# Patient Record
Sex: FEMALE | Race: WHITE
[De-identification: names, ages, dates, MRNs, and addresses within clinical notes are randomized per-mention and may not be internally consistent; named-entity substitution may affect disease eponyms.]

---

## 2020-10-31 ENCOUNTER — HOSPITAL ENCOUNTER (EMERGENCY)
Dept: HOSPITAL 11 - JP.ED | Age: 60
Discharge: HOME | End: 2020-10-31
Payer: COMMERCIAL

## 2020-10-31 DIAGNOSIS — N39.0: Primary | ICD-10-CM

## 2020-10-31 DIAGNOSIS — Z88.2: ICD-10-CM

## 2020-10-31 PROCEDURE — 87086 URINE CULTURE/COLONY COUNT: CPT

## 2020-10-31 PROCEDURE — 87186 SC STD MICRODIL/AGAR DIL: CPT

## 2020-10-31 PROCEDURE — 81001 URINALYSIS AUTO W/SCOPE: CPT

## 2020-10-31 PROCEDURE — 99283 EMERGENCY DEPT VISIT LOW MDM: CPT

## 2020-10-31 PROCEDURE — 87088 URINE BACTERIA CULTURE: CPT

## 2020-10-31 NOTE — EDM.PDOC
ED HPI GENERAL MEDICAL PROBLEM





- General


Chief Complaint: Genitourinary Problem


Stated Complaint: BLADDER INFECTION


Time Seen by Provider: 10/31/20 03:44


Source of Information: Reports: Patient, RN Notes Reviewed


History Limitations: Reports: No Limitations





- History of Present Illness


INITIAL COMMENTS - FREE TEXT/NARRATIVE: 





60-year-old gentleman presents emergency department with a complaint of dysuria,

she states been going on for 24 hours she is having no other symptoms no fevers


  ** Suprapubic


Pain Score (Numeric/FACES): 5





- Related Data


                                    Allergies











Allergy/AdvReac Type Severity Reaction Status Date / Time


 


Sulfa (Sulfonamide Allergy  Swelling Verified 10/31/20 03:24





Antibiotics)     











Home Meds: 


                                    Home Meds





NK [No Known Home Meds]  10/31/20 [History]


methocarbamoL [Methocarbamol] 500 mg PO ASDIRECTED PRN 10/31/20 [History]











Past Medical History


HEENT History: Reports: Impaired Vision


Cardiovascular History: Reports: High Cholesterol, Other (See Below)


Other Cardiovascular History: PFO - did not close as she grew


Genitourinary History: Reports: UTI, Recurrent


Musculoskeletal History: Reports: Back Pain, Chronic


Oncologic (Cancer) History: Reports: Lung





- Infectious Disease History


Infectious Disease History: Reports: Chicken Pox, Herpes, Measles, Mumps, 

Rubella





- Past Surgical History


HEENT Surgical History: Reports: LASIK


Oncologic Surgical History: Reports: Lobectomy, Other (See Below)


Other Oncologic Surgeries/Procedures: right lower lobe





Social & Family History





- Tobacco Use


Tobacco Use Status *Q: Never Tobacco User





ED ROS GENERAL





- Review of Systems


Review Of Systems: See Below


Constitutional: Denies: Fever, Chills


: Reports: Dysuria





ED EXAM, RENAL/





- Physical Exam


Exam: See Below


Exam Limited By: No Limitations


General Appearance: Alert, WD/WN, No Apparent Distress


Respiratory/Chest: No Respiratory Distress


GI/Abdominal: Soft, Tender (Suprapubic discomfort)





Course





- Vital Signs


Last Recorded V/S: 





                                Last Vital Signs











Temp  97.3 F   10/31/20 03:36


 


Pulse  95   10/31/20 03:36


 


Resp  18   10/31/20 03:36


 


BP  109/69   10/31/20 03:36


 


Pulse Ox  98   10/31/20 03:36














- Orders/Labs/Meds


Orders: 





                               Active Orders 24 hr











 Category Date Time Status


 


 CULTURE URINE [RM] Urgent Lab  10/31/20 03:45 Ordered


 


 Phenazopyridine [Urinary Pain Relief] Med  10/31/20 03:53 Once





 190 mg PO ONETIME ONE   








                                Medication Orders





Phenazopyridine HCl (Urinary Pain Relief)  190 mg PO ONETIME ONE


   Stop: 10/31/20 03:54








Labs: 





                                Laboratory Tests











  10/31/20 Range/Units





  03:18 


 


Urine Color  Yellow  (YELLOW)  


 


Urine Appearance  Cloudy A  (CLEAR)  


 


Urine pH  5.5  (5.0-8.0)  


 


Ur Specific Gravity  >= 1.030  (1.008-1.030)  


 


Urine Protein  100 H  (NEGATIVE)  mg/dL


 


Urine Glucose (UA)  Negative  (NEGATIVE)  mg/dL


 


Urine Ketones  Negative  (NEGATIVE)  mg/dL


 


Urine Occult Blood  Large H  (NEGATIVE)  


 


Urine Nitrite  Positive H  (NEGATIVE)  


 


Urine Bilirubin  Negative  (NEGATIVE)  


 


Urine Urobilinogen  0.2  (0.2-1.0)  EU/dL


 


Ur Leukocyte Esterase  Small H  (NEGATIVE)  


 


Urine RBC  >100 H  (0-5)  


 


Urine WBC  20-30 H  (0-5)  


 


Ur Epithelial Cells  Few  


 


Amorphous Sediment  Not seen  


 


Urine Bacteria  Many  


 


Urine Mucus  Not seen  











Meds: 





Medications











Generic Name Dose Route Start Last Admin





  Trade Name Freq  PRN Reason Stop Dose Admin


 


Phenazopyridine HCl  190 mg  10/31/20 03:53 





  Urinary Pain Relief  PO  10/31/20 03:54 





  ONETIME ONE  














Departure





- Departure


Time of Disposition: 03:56


Disposition: Home, Self-Care 01


Condition: Good


Clinical Impression: 


 UTI, Urinary tract infectious disease








- Discharge Information


Instructions:  Urinary Tract Infection, Adult, Easy-to-Read


Referrals: 


PCP,None [Primary Care Provider] - 


Additional Instructions: 


Take full course of antibiotics, continue to use Pyridium for comfort, your 

culture results will be available in 3 to 4 days,  please followup with your 

primary care provider in 3-5 days if not better, please call return to the 

emergency department with worsening of symptoms.





Sepsis Event Note (ED)





- Evaluation


Sepsis Screening Result: No Definite Risk





- Focused Exam


Vital Signs: 





                                   Vital Signs











  Temp Pulse Resp BP Pulse Ox


 


 10/31/20 03:36  97.3 F  95  18  109/69  98


 


 10/31/20 03:35  97.3 F  95  18  109/69  98














- My Orders


Last 24 Hours: 





My Active Orders





10/31/20 03:45


CULTURE URINE [RM] Urgent 





10/31/20 03:53


Phenazopyridine [Urinary Pain Relief]   190 mg PO ONETIME ONE 














- Assessment/Plan


Last 24 Hours: 





My Active Orders





10/31/20 03:45


CULTURE URINE [RM] Urgent 





10/31/20 03:53


Phenazopyridine [Urinary Pain Relief]   190 mg PO ONETIME ONE 











Plan: 





Assessment





Acuity = acute





Site and laterality = urinary tract infection





Etiology  = probable bacterial cause





Manifestations = none





Location of injury =  Home





Lab values = greater than 100 RBCs consistent with hematuria 20-30 WBCs 

consistent with pyuria cultures pending positive nitrates





Plan


Treat empirically Augmentin 875 p.o. twice daily x7 days in combination with 

Pyridium, follow-up primary care 3 to 5 days if not better culture results will 

be available in 3 to 4 days

















 This note was dictated using dragon voice recognition software please call with

any questions on syntax or grammar.

## 2020-11-06 ENCOUNTER — HOSPITAL ENCOUNTER (EMERGENCY)
Dept: HOSPITAL 11 - JP.ED | Age: 60
Discharge: HOME | End: 2020-11-06
Payer: MEDICAID

## 2020-11-06 DIAGNOSIS — Z88.2: ICD-10-CM

## 2020-11-06 DIAGNOSIS — N30.90: Primary | ICD-10-CM

## 2020-11-06 PROCEDURE — 81001 URINALYSIS AUTO W/SCOPE: CPT

## 2020-11-06 PROCEDURE — 99283 EMERGENCY DEPT VISIT LOW MDM: CPT

## 2020-11-06 NOTE — EDM.PDOC
ED HPI GENERAL MEDICAL PROBLEM





- General


Chief Complaint: Genitourinary Problem


Stated Complaint: UTI


Time Seen by Provider: 11/06/20 20:29


Source of Information: Reports: Patient


History Limitations: Reports: No Limitations





- History of Present Illness


INITIAL COMMENTS - FREE TEXT/NARRATIVE: 





chief complaint: bladder pain and pressure





This is a 60 year old female presents to ER for evaluation of bladder pain and 

pressure. She reports it feels just her bladder infection never resolved and now

feels like it is getting worse.





denies fever, chills, nausea, vomiting, diarrhea.


reports dysuria, frequency, bladder pain and pressure


Onset Date: 10/31/20


Duration: Getting Worse


Location: Reports: Abdomen


Quality: Reports: Same as Previous Episode


Improves with: Reports: None


Worsens with: Reports: None


Associated Symptoms: Reports: No Other Symptoms


Treatments PTA: Reports: Other Medication(s)





- Related Data


                                    Allergies











Allergy/AdvReac Type Severity Reaction Status Date / Time


 


Sulfa (Sulfonamide Allergy  Swelling Verified 11/06/20 21:25





Antibiotics)     











Home Meds: 


                                    Home Meds





methocarbamoL [Methocarbamol] 500 mg PO ASDIRECTED PRN 10/31/20 [History]


Amoxicillin/Potassium Clav [Augmentin 875-125 Tablet] 1 tab PO BID 11/06/20 

[History]











Past Medical History


HEENT History: Reports: Impaired Vision


Cardiovascular History: Reports: High Cholesterol, Other (See Below)


Other Cardiovascular History: PFO - did not close as she grew


Genitourinary History: Reports: UTI, Recurrent


Musculoskeletal History: Reports: Back Pain, Chronic


Oncologic (Cancer) History: Reports: Lung





- Infectious Disease History


Infectious Disease History: Reports: Chicken Pox, Herpes, Measles, Mumps, 

Rubella





- Past Surgical History


HEENT Surgical History: Reports: LASIK


Oncologic Surgical History: Reports: Lobectomy, Other (See Below)


Other Oncologic Surgeries/Procedures: right lower lobe





Social & Family History





- Tobacco Use


Tobacco Use Status *Q: Never Tobacco User





- Recreational Drug Use


Recreational Drug Use: No





ED ROS GENERAL





- Review of Systems


Review Of Systems: See Below


Constitutional: Reports: No Symptoms


HEENT: Reports: No Symptoms


Respiratory: Reports: No Symptoms


Cardiovascular: Reports: No Symptoms


Endocrine: Reports: No Symptoms


GI/Abdominal: Reports: Abdominal Pain (low pelvic pain and pressure)


: Reports: Dysuria, Frequency, Hematuria, Pain, Urgency


Musculoskeletal: Reports: Back Pain (chronic)


Skin: Reports: No Symptoms, Change in Color


Psychiatric: Reports: No Symptoms


Hematologic/Lymphatic: Reports: No Symptoms


Immunologic: Reports: No Symptoms





ED EXAM, GI/ABD





- Physical Exam


Exam: See Below


Exam Limited By: No Limitations


General Appearance: Alert, WD/WN, No Apparent Distress, Other (neat and well 

groomed, pleasant. appears younger than stated age.)


Eyes: Bilateral: Normal Appearance


Respiratory/Chest: No Respiratory Distress


GI/Abdominal Exam: Normal Bowel Sounds, Soft, No Distention, Tender 

(pelvic/bladder)


 (Female) Exam: Deferred


Rectal (Female) Exam: Deferred


Back Exam: Normal Inspection, Full Range of Motion


Extremities: Normal Inspection, Normal Range of Motion


Neurological: Alert, Oriented, CN II-XII Intact, Normal Cognition, Normal Gait, 

Normal Reflexes, No Motor/Sensory Deficits


Psychiatric: Normal Affect, Normal Mood


Skin Exam: Warm, Dry, Intact, Normal Color, No Rash


Lymphatic: No Adenopathy





Course





- Vital Signs


Last Recorded V/S: 


                                Last Vital Signs











Temp  35.9 C L  11/06/20 21:27


 


Pulse  70   11/06/20 21:27


 


Resp  16   11/06/20 21:27


 


BP  122/76   11/06/20 21:27


 


Pulse Ox  98   11/06/20 21:27














- Orders/Labs/Meds


Labs: 


                                Laboratory Tests











  11/06/20 Range/Units





  20:32 


 


Urine Color  Yellow  (YELLOW)  


 


Urine Appearance  Clear  (CLEAR)  


 


Urine pH  7.5  (5.0-8.0)  


 


Ur Specific Gravity  1.020  (1.008-1.030)  


 


Urine Protein  Negative  (NEGATIVE)  mg/dL


 


Urine Glucose (UA)  Negative  (NEGATIVE)  mg/dL


 


Urine Ketones  Negative  (NEGATIVE)  mg/dL


 


Urine Occult Blood  Trace-lysed H  (NEGATIVE)  


 


Urine Nitrite  Negative  (NEGATIVE)  


 


Urine Bilirubin  Negative  (NEGATIVE)  


 


Urine Urobilinogen  0.2  (0.2-1.0)  EU/dL


 


Ur Leukocyte Esterase  Negative  (NEGATIVE)  


 


Urine RBC  Not seen  (0-5)  


 


Urine WBC  Not seen  (0-5)  


 


Ur Epithelial Cells  Not seen  


 


Amorphous Sediment  Few  


 


Urine Bacteria  Not seen  











Meds: 


Medications














Discontinued Medications














Generic Name Dose Route Start Last Admin





  Trade Name Freq  PRN Reason Stop Dose Admin


 


Phenazopyridine HCl  190 mg  11/06/20 22:14  11/06/20 22:19





  Urinary Pain Relief  PO  11/06/20 22:15  190 mg





  ONETIME ONE   Administration














Departure





- Departure


Time of Disposition: 22:06


Disposition: Home, Self-Care 01


Condition: Good


Clinical Impression: 


 Cystitis








- Discharge Information


*PRESCRIPTION DRUG MONITORING PROGRAM REVIEWED*: Not Applicable


*COPY OF PRESCRIPTION DRUG MONITORING REPORT IN PATIENT KI: Not Applicable


Instructions:  Hemorrhagic Cystitis


Referrals: 


Felipe Arriaza MD [Primary Care Provider] - 


Forms:  ED Department Discharge


Care Plan Goals: 


cystitis


-Macrobid 100mg one by mouth two times a day for 5 days


-Difluconazole 150 mg po -take at onset of yeast symptoms.


-push fluids 8 to 10 glasses of water


-rest





Return to ER for any nausea, vomiting, diarrhea, rash, fever of 101 or greater, 

chills or any concerns.





Sepsis Event Note (ED)





- Evaluation


Sepsis Screening Result: No Definite Risk





- Focused Exam


Vital Signs: 


                                   Vital Signs











  Temp Pulse Resp BP Pulse Ox


 


 11/06/20 21:27  35.9 C L  70  16  122/76  98














- Problem List & Annotations


(1) Cystitis


SNOMED Code(s): 68754830


   Code(s): N30.90 - CYSTITIS, UNSPECIFIED WITHOUT HEMATURIA   Status: Acute   

Priority: High   





- Problem List Review


Problem List Initiated/Reviewed/Updated: Yes





- Assessment/Plan


Plan: 





cystitis


-Macrobid 100mg one by mouth two times a day for 5 days


-Difluconazole 150 mg po -take at onset of yeast symptoms.


-push fluids 8 to 10 glasses of water


-rest





Return to ER for any nausea, vomiting, diarrhea, rash, fever of 101 or greater, 

chills or any concerns.

## 2021-04-26 ENCOUNTER — HOSPITAL ENCOUNTER (EMERGENCY)
Dept: HOSPITAL 11 - JP.ED | Age: 61
Discharge: HOME | End: 2021-04-26
Payer: MEDICAID

## 2021-04-26 DIAGNOSIS — T46.6X5A: ICD-10-CM

## 2021-04-26 DIAGNOSIS — Z88.2: ICD-10-CM

## 2021-04-26 DIAGNOSIS — L27.1: Primary | ICD-10-CM

## 2021-04-26 DIAGNOSIS — Z87.891: ICD-10-CM

## 2021-04-26 NOTE — EDM.PDOC
ED HPI GENERAL MEDICAL PROBLEM





- General


Chief Complaint: Allergic Reaction


Stated Complaint: RASH ON BOTH ARMS HAD A CHANGE IN MEDS


Time Seen by Provider: 04/26/21 12:55


Source of Information: Reports: Patient, RN Notes Reviewed


History Limitations: Reports: No Limitations





- History of Present Illness


INITIAL COMMENTS - FREE TEXT/NARRATIVE: 





60-year-old female presents emergency department today with rash encompasses 

predominantly her upper extremities she has not noticed any on her trunk 

although she is quite itchy on the trunk and extremities.  Recently started new 

medication of Zetia she has since weaned herself off this medication half-life 

is about 22 hours





- Related Data


                                    Allergies











Allergy/AdvReac Type Severity Reaction Status Date / Time


 


Sulfa (Sulfonamide Allergy  Swelling Verified 04/26/21 12:39





Antibiotics)     











Home Meds: 


                                    Home Meds





methocarbamoL [Methocarbamol] 500 mg PO ASDIRECTED PRN 10/31/20 [History]


Ezetimibe [Zetia] 10 mg PO DAILY 04/26/21 [History]


Famciclovir 500 mg PO DAILY 04/26/21 [History]


predniSONE [Prednisone] 20 mg PO DAILY #3 tablet 04/26/21 [Rx]











Past Medical History


HEENT History: Reports: Impaired Vision


Cardiovascular History: Reports: High Cholesterol, Other (See Below)


Other Cardiovascular History: PFO - did not close as she grew


Respiratory History: Reports: Other (See Below)


Other Respiratory History: lung cancer removed feb 2020


Genitourinary History: Reports: UTI, Recurrent


Musculoskeletal History: Reports: Back Pain, Chronic


Oncologic (Cancer) History: Reports: Lung





- Infectious Disease History


Infectious Disease History: Reports: Chicken Pox, Herpes, Measles, Mumps, 

Rubella





- Past Surgical History


HEENT Surgical History: Reports: LASIK


GI Surgical History: Reports: Colonoscopy


Oncologic Surgical History: Reports: Lobectomy, Other (See Below)


Other Oncologic Surgeries/Procedures: right lower lobe





Social & Family History





- Tobacco Use


Tobacco Use Status *Q: Former Tobacco User


Used Tobacco, but Quit: Yes


Month/Year Tobacco Last Used: 26 years





- Caffeine Use


Caffeine Use: Reports: Soda, Tea





- Recreational Drug Use


Recreational Drug Use: No





ED ROS ALLERGIC REACTION





- Review of Systems


Review Of Systems: See Below


Constitutional: Reports: No Symptoms


Skin: Reports: Rash





ED EXAM GENERAL NO PERIP PULSE





- Physical Exam


Exam: See Below


Text/Narrative:: 





Examination of the integument system I do appreciate uticaric type rash on the 

upper extremities appreciate any rash on the trunk


Exam Limited By: No Limitations


General Appearance: Alert, WD/WN, No Apparent Distress


Respiratory/Chest: No Respiratory Distress, Lungs Clear, Normal Breath Sounds, 

No Accessory Muscle Use, Chest Non-Tender


Cardiovascular: Regular Rate, Rhythm, No Murmur





Course





- Vital Signs


Last Recorded V/S: 





                                Last Vital Signs











Temp  98.0 F   04/26/21 12:35


 


Pulse  66   04/26/21 12:35


 


Resp  16   04/26/21 12:35


 


BP  117/78   04/26/21 12:35


 


Pulse Ox  98   04/26/21 12:35














Departure





- Departure


Time of Disposition: 13:03


Disposition: Home, Self-Care 01


Condition: Fair


Clinical Impression: 


 Drug rash








- Discharge Information


Prescriptions: 


predniSONE [Prednisone] 20 mg PO DAILY #3 tablet


Instructions:  Allergies, Adult


Referrals: 


Princess Trejo MD [Primary Care Provider] - 


Additional Instructions: 


Try the prednisone once a day for the next 3 days, stop Zetia,  please followup 

with your primary care provider in 3-5 days if not better, please call return to

the emergency department with worsening of symptoms.





Sepsis Event Note (ED)





- Evaluation


Sepsis Screening Result: No Definite Risk





- Focused Exam


Vital Signs: 





                                   Vital Signs











  Temp Pulse Resp BP Pulse Ox


 


 04/26/21 12:35  98.0 F  66  16  117/78  98














- Assessment/Plan


Plan: 





Assessment





Acuity = acute





Site and laterality = hives





Etiology  = possibly related to medications area





Manifestations = none





Location of injury =  Home





Lab values = none





Plan


Continue to use Benadryl as needed for symptomatic relief short course of 

prednisone 20 mg once a day for 3 days follow-up primary care 3 to 5 days if not

better

















 This note was dictated using dragon voice recognition software please call with

any questions on syntax or grammar.

## 2021-05-01 ENCOUNTER — HOSPITAL ENCOUNTER (EMERGENCY)
Dept: HOSPITAL 11 - JP.ED | Age: 61
Discharge: HOME | End: 2021-05-01
Payer: MEDICAID

## 2021-05-01 DIAGNOSIS — M62.838: ICD-10-CM

## 2021-05-01 DIAGNOSIS — Z88.8: ICD-10-CM

## 2021-05-01 DIAGNOSIS — M43.6: Primary | ICD-10-CM

## 2021-05-01 DIAGNOSIS — Z88.2: ICD-10-CM

## 2021-05-01 PROCEDURE — 80053 COMPREHEN METABOLIC PANEL: CPT

## 2021-05-01 PROCEDURE — 85025 COMPLETE CBC W/AUTO DIFF WBC: CPT

## 2021-05-01 PROCEDURE — 99283 EMERGENCY DEPT VISIT LOW MDM: CPT

## 2021-05-01 PROCEDURE — 36415 COLL VENOUS BLD VENIPUNCTURE: CPT

## 2021-05-01 PROCEDURE — 85651 RBC SED RATE NONAUTOMATED: CPT

## 2021-05-01 PROCEDURE — 96372 THER/PROPH/DIAG INJ SC/IM: CPT

## 2021-05-01 PROCEDURE — 86140 C-REACTIVE PROTEIN: CPT

## 2021-05-01 NOTE — EDM.PDOC
ED HPI GENERAL MEDICAL PROBLEM





- General


Chief Complaint: Neck Problem


Stated Complaint: REACTION TO MEDS


Time Seen by Provider: 05/01/21 03:28


Source of Information: Reports: Patient, Old Records


History Limitations: Reports: No Limitations





- History of Present Illness


INITIAL COMMENTS - FREE TEXT/NARRATIVE: 


Ember is a 60-year-old female presenting to the ED for evaluation of bilateral 

exterior neck pain.  The patient was recently seen on 4/26/2021 for acute onset 

of urticaria in bilateral upper extremities that was attributed to recently 

being started on Zetia for her high cholesterol.  She was placed on a 3-day 

course of prednisone 20 mg daily and had improvement in her rash, however, she 

completed this on Wednesday and then developed the onset of the neck pain on Thu

rsday.  She denies any trauma.  She has not been doing any heavy lifting as she 

is recovering from a right lower lobectomy due to lung cancer in February 2020. 

She denies any new neurologic symptoms.  She did take half of a tablet of 

methocarbamol 500 milligrams earlier tonight with some improvement in her pain, 

however, it is subsided.  As a result, she has been unable to sleep due to the 

pain in the neck.





Treatments PTA: Reports: Other (see below)


Other Treatments PTA: robaxin


  ** Posterior Neck


Pain Score (Numeric/FACES): 8





- Related Data


                                    Allergies











Allergy/AdvReac Type Severity Reaction Status Date / Time


 


ezetimibe [From Zetia] Allergy  Rash Verified 05/01/21 03:27


 


Sulfa (Sulfonamide Allergy  Swelling Verified 05/01/21 03:26





Antibiotics)     











Home Meds: 


                                    Home Meds





methocarbamoL [Methocarbamol] 500 mg PO ASDIRECTED PRN 10/31/20 [History]


Famciclovir 500 mg PO DAILY 04/26/21 [History]











Past Medical History


HEENT History: Reports: Impaired Vision


Cardiovascular History: Reports: High Cholesterol, Other (See Below)


Other Cardiovascular History: PFO - did not close as she grew


Respiratory History: Reports: Other (See Below)


Other Respiratory History: lung cancer removed feb 2020


Gastrointestinal History: Reports: None


Genitourinary History: Reports: UTI, Recurrent


Musculoskeletal History: Reports: Back Pain, Chronic


Neurological History: Reports: None


Psychiatric History: Reports: None


Endocrine/Metabolic History: Reports: None


Hematologic History: Reports: None


Immunologic History: Reports: None


Oncologic (Cancer) History: Reports: Lung


Dermatologic History: Reports: None





- Infectious Disease History


Infectious Disease History: Reports: Chicken Pox, Herpes, Measles, Mumps, 

Rubella





- Past Surgical History


HEENT Surgical History: Reports: LASIK


Respiratory Surgical History: Reports: Lung Resection


GI Surgical History: Reports: Colonoscopy


Oncologic Surgical History: Reports: Lobectomy, Other (See Below)


Other Oncologic Surgeries/Procedures: right lower lobe





Social & Family History





- Tobacco Use


Tobacco Use Status *Q: Former Tobacco User


Used Tobacco, but Quit: Yes


Month/Year Tobacco Last Used: 2000





- Caffeine Use


Caffeine Use: Reports: Soda, Tea





- Recreational Drug Use


Recreational Drug Use: No





ED ROS GENERAL





- Review of Systems


Review Of Systems: See Below


Constitutional: Reports: No Symptoms


HEENT: Reports: No Symptoms


Respiratory: Reports: No Symptoms


Cardiovascular: Reports: No Symptoms


Endocrine: Reports: No Symptoms


GI/Abdominal: Reports: No Symptoms


: Reports: No Symptoms


Musculoskeletal: Reports: Neck Pain


Skin: Reports: No Symptoms


Neurological: Reports: No Symptoms


Psychiatric: Reports: No Symptoms


Hematologic/Lymphatic: Reports: No Symptoms


Immunologic: Reports: No Symptoms





ED EXAM, UPPER BACK/NECK PAIN





- Physical Exam


Exam: See Below


Exam Limited By: No Limitations


General Appearance: Alert, No Apparent Distress


Eye Exam: Bilateral Eye: EOMI, PERRL


Throat/Mouth Exam: Normal Inspection, Normal Lips, Normal Oropharynx, Normal 

Voice, No Airway Compromise


Head Exam: Atraumatic, Normocephalic


Neck Exam: Limited Range of Motion (Mild limitation to range of motion in all 

directions secondary to muscle spasm and tenderness.), Muscle Spasm (Bilateral 

trapezius muscle spasm), Paraspinous Muscle Tender (Bilateral paraspinal muscle 

spasm and tenderness extending into the bilateral trapezius muscles), Tender 

Lateral.  No: Spinous Processes Tender, Tender Midline


Nexus Criteria: No: Posterior, Midline Cervical Tenderness, Evidence of 

Intoxication, Altered Level of Consciousness, Focal Neurological Deficit, 

Painful Distraction Injuries


Cardiovascular/Respiratory: Regular Rate, Rhythm, No M/R/G, Normal Peripheral 

Pulses, Normal Breath Sounds


Extremities: Normal Inspection, Normal Range of Motion


Neurologic: No Motor/Sensory Deficits, Alert, Normal Mood/Affect, Oriented x 3


Psychiatric: Normal Affect, Normal Mood





Course





- Vital Signs


Last Recorded V/S: 


                                Last Vital Signs











Temp  35.9 C L  05/01/21 03:23


 


Pulse  62   05/01/21 03:23


 


Resp  16   05/01/21 03:23


 


BP  133/66   05/01/21 03:23


 


Pulse Ox  97   05/01/21 03:23














- Orders/Labs/Meds


Orders: 


                               Active Orders 24 hr











 Category Date Time Status


 


 CBC WITH AUTO DIFF [HEME] Stat Lab  05/01/21 03:40 Results


 


 SEDIMENTATION RATE MANUAL [HEME] Stat Lab  05/01/21 03:40 Results











Labs: 


                                Laboratory Tests











  05/01/21 05/01/21 Range/Units





  03:40 03:40 


 


WBC  4.2 L   (4.5-11.0)  K/uL


 


RBC  4.59   (3.30-5.50)  M/uL


 


Hgb  13.7   (12.0-15.0)  g/dL


 


Hct  42.4   (36.0-48.0)  %


 


MCV  92   (80-98)  fL


 


MCH  30   (27-31)  pg


 


MCHC  32   (32-36)  %


 


Plt Count  188   (150-400)  K/uL


 


Neut % (Auto)  54   (36-66)  %


 


Lymph % (Auto)  35   (24-44)  %


 


Mono % (Auto)  9 H   (2-6)  %


 


Eos % (Auto)  2   (2-4)  %


 


Baso % (Auto)  1   (0-1)  %


 


Sodium   146  (140-148)  mmol/L


 


Potassium   4.3  (3.6-5.2)  mmol/L


 


Chloride   104  (100-108)  mmol/L


 


Carbon Dioxide   29  (21-32)  mmol/L


 


Anion Gap   13.1  (5.0-14.0)  mmol/L


 


BUN   19 H  (7-18)  mg/dL


 


Creatinine   0.8  (0.6-1.0)  mg/dL


 


Est Cr Clr Drug Dosing   57.67  mL/min


 


Estimated GFR (MDRD)   > 60  (>60)  


 


Glucose   100  ()  mg/dL


 


Calcium   8.8  (8.5-10.1)  mg/dL


 


Total Bilirubin   0.2  (0.2-1.0)  mg/dL


 


AST   14 L  (15-37)  U/L


 


ALT   24  (12-78)  U/L


 


Alkaline Phosphatase   77  ()  U/L


 


C-Reactive Protein   < 0.05  (0.0-0.3)  mg/dL


 


Total Protein   7.1  (6.4-8.2)  g/dL


 


Albumin   3.6  (3.4-5.0)  g/dL


 


Globulin   3.5  (2.3-3.5)  g/dL


 


Albumin/Globulin Ratio   1.0 L  (1.2-2.2)  











Meds: 


Medications














Discontinued Medications














Generic Name Dose Route Start Last Admin





  Trade Name Lucie  PRN Reason Stop Dose Admin


 


Ketorolac Tromethamine  30 mg  05/01/21 03:34  05/01/21 03:48





  Ketorolac 30 Mg/Ml Sdv  IM  05/01/21 03:35  30 mg





  ONETIME ONE   Administration














- Re-Assessments/Exams


Free Text/Narrative Re-Assessment/Exam: 





05/01/21 04:16 based on my exam the patient has bilateral trapezius muscle spasm

 causing torticollis.  She had taken a half dose of her methocarbamol with some 

improvement, however, it probably was not sufficient to get this under control. 

 I am recommending that she take a full dose of the methocarbamol every 6 hours.

  We will also put her on oral Toradol for the next 5 days.  Labs look good 

without any suggestion of hypokalemia.  She was not on prednisone for a 

sufficient time to cause any adrenal suppression as was mentioned by the triage 

nurse to the patient.  I do anticipate she will have full recovery of this in 

the next 3 to 5 days.  Indications return to the ED were discussed.








Departure





- Departure


Time of Disposition: 04:18


Disposition: Home, Self-Care 01


Clinical Impression: 


 Acute torticollis, Trapezius muscle spasm








- Discharge Information


Instructions:  Acute Torticollis, Adult


Referrals: 


Princess Trejo MD [Primary Care Provider] - 


Forms:  ED Department Discharge


Care Plan Goals: 


Your work-up today has shown normal labs.  My exam demonstrates that you have 

spasm in bilateral trapezius muscles causing the stiffness in your neck.  I am 

recommending you take your methocarbamol 500 mg 4 times a day for the next 2 to 

3 days to reduce the spasm in your neck.  In addition I have put you on an oral 

form of what you received in the shot to help control the pain.  You may benefit

from icing the neck which will reduce spasm as well.  Anticipate she will have 

full recovery of this in the next 3 to 5 days with incremental improvement each 

day.





Sepsis Event Note (ED)





- Evaluation


Sepsis Screening Result: No Definite Risk





- Focused Exam


Vital Signs: 


                                   Vital Signs











  Temp Pulse Resp BP Pulse Ox


 


 05/01/21 03:23  35.9 C L  62  16  133/66  97














- Problem List & Annotations


(1) Acute torticollis


SNOMED Code(s): 47283602, 82608180


   Code(s): M43.6 - TORTICOLLIS   Status: Acute   Priority: Medium   Current 

Visit: Yes   





(2) Trapezius muscle spasm


SNOMED Code(s): 050273926979


   Code(s): M62.838 - OTHER MUSCLE SPASM   Status: Acute   Priority: Medium   

Current Visit: Yes   





- Problem List Review


Problem List Initiated/Reviewed/Updated: Yes





- My Orders


Last 24 Hours: 


My Active Orders





05/01/21 03:40


CBC WITH AUTO DIFF [HEME] Stat 


SEDIMENTATION RATE MANUAL [HEME] Stat 














- Assessment/Plan


Last 24 Hours: 


My Active Orders





05/01/21 03:40


CBC WITH AUTO DIFF [HEME] Stat 


SEDIMENTATION RATE MANUAL [HEME] Stat

## 2021-07-20 ENCOUNTER — HOSPITAL ENCOUNTER (EMERGENCY)
Dept: HOSPITAL 11 - JP.ED | Age: 61
Discharge: HOME | End: 2021-07-20
Payer: MEDICAID

## 2021-07-20 DIAGNOSIS — Z88.8: ICD-10-CM

## 2021-07-20 DIAGNOSIS — Z88.2: ICD-10-CM

## 2021-07-20 DIAGNOSIS — M54.42: Primary | ICD-10-CM

## 2021-07-20 NOTE — EDM.PDOC
ED HPI GENERAL MEDICAL PROBLEM





- General


Chief Complaint: Back Pain or Injury


Stated Complaint: LEFT LEG PAIN


Time Seen by Provider: 07/20/21 04:36


Source of Information: Reports: Patient, RN Notes Reviewed


History Limitations: Reports: No Limitations





- History of Present Illness


INITIAL COMMENTS - FREE TEXT/NARRATIVE: 





60-year-old female presents emergency department day complaint of low back pain,

she has been dealing this with this for about 2 weeks injured herself moving 

some furniture has been to her primary care twice and chiropractic care once is 

using Toradol and methadone carbinol with some relief.  But she is having 

difficulty sleeping at night because she is uncomfortable.  No loss of bowel or 

bladder


  ** Left Lower Back


Pain Score (Numeric/FACES): 6





- Related Data


                                    Allergies











Allergy/AdvReac Type Severity Reaction Status Date / Time


 


ezetimibe [From Zetia] Allergy  Rash Verified 07/20/21 04:28


 


Sulfa (Sulfonamide Allergy  Swelling Verified 07/20/21 04:28





Antibiotics)     











Home Meds: 


                                    Home Meds





Famciclovir 500 mg PO DAILY 04/26/21 [History]


methocarbamoL [Methocarbamol] 500 mg PO QID PRN #40 tablet 05/01/21 [Rx]











Past Medical History


HEENT History: Reports: Impaired Vision


Cardiovascular History: Reports: High Cholesterol, Other (See Below)


Other Cardiovascular History: PFO - did not close as she grew


Respiratory History: Reports: Other (See Below)


Other Respiratory History: lung cancer removed feb 2020


Gastrointestinal History: Reports: None


Genitourinary History: Reports: UTI, Recurrent


Musculoskeletal History: Reports: Back Pain, Chronic


Neurological History: Reports: None


Psychiatric History: Reports: None


Endocrine/Metabolic History: Reports: None


Hematologic History: Reports: None


Immunologic History: Reports: None


Oncologic (Cancer) History: Reports: Lung


Dermatologic History: Reports: None





- Infectious Disease History


Infectious Disease History: Reports: Chicken Pox, Herpes, Measles, Mumps, 

Rubella





- Past Surgical History


HEENT Surgical History: Reports: LASIK


Respiratory Surgical History: Reports: Lung Resection


GI Surgical History: Reports: Colonoscopy


Oncologic Surgical History: Reports: Lobectomy, Other (See Below)


Other Oncologic Surgeries/Procedures: right lower lobe





Social & Family History





- Tobacco Use


Tobacco Use Status *Q: Never Tobacco User





- Caffeine Use


Caffeine Use: Reports: None





- Recreational Drug Use


Recreational Drug Use: No





ED ROS GENERAL





- Review of Systems


Review Of Systems: See Below


GI/Abdominal: Reports: No Symptoms


Musculoskeletal: Reports: Neck Pain


Neurological: Reports: No Symptoms





ED EXAM,LOWER BACK PAIN/INJURY





- Physical Exam


Exam: See Below


Exam Limited By: No Limitations


General Appearance: Alert, WD/WN, No Apparent Distress


Respiratory/Chest: No Respiratory Distress


Back Exam: Normal Inspection, Decreased Range of Motion, Paraspinal Tenderness. 

 No: CVA Tenderness (R), CVA Tenderness (L), Vertebral Tenderness





Course





- Vital Signs


Last Recorded V/S: 





                                Last Vital Signs











Temp  98.2 F   07/20/21 04:31


 


Pulse  74   07/20/21 04:31


 


Resp  16   07/20/21 04:31


 


BP  139/86   07/20/21 04:31


 


Pulse Ox  98   07/20/21 04:31














Departure





- Departure


Time of Disposition: 04:43


Disposition: Home, Self-Care 01


Condition: Fair


Clinical Impression: 


Back pain


Qualifiers:


 Back pain location: low back pain Chronicity: acute Back pain laterality: left 

Sciatica presence: with sciatica Sciatica laterality: sciatica of left side 

Qualified Code(s): M54.42 - Lumbago with sciatica, left side








- Discharge Information


Instructions:  Acute Back Pain, Adult


Referrals: 


PCP,None [Primary Care Provider] - 


Additional Instructions: 


, Use your Toradol for baseline pain control use the hydrocodone for 

breakthrough pain, take prednisone 1 tablet once a day for the next 5 days,  

please followup with your primary care provider in 5-7 days if not better, 

please call return to the emergency department with worsening of symptoms.





Sepsis Event Note (ED)





- Evaluation


Sepsis Screening Result: No Definite Risk





- Focused Exam


Vital Signs: 





                                   Vital Signs











  Temp Pulse Resp BP Pulse Ox


 


 07/20/21 04:31  98.2 F  74  16  139/86  98














- Assessment/Plan


Plan: 





Assessment





Acuity = acute





Site and laterality = low back pain secondary to injury





Etiology  = lifting injury





Manifestations = none





Location of injury =  Home





Lab values = none





Plan


Prescription written for prednisone 20 mg once a day for 5 days as well as 

hydrocodone 5/325 1 tab p.o. 3 times daily as needed total #6 follow-up primary 

care in 5 to 7 days if no improvement

















 This note was dictated using dragon voice recognition software please call with

any questions on syntax or grammar.